# Patient Record
Sex: MALE | Race: OTHER
[De-identification: names, ages, dates, MRNs, and addresses within clinical notes are randomized per-mention and may not be internally consistent; named-entity substitution may affect disease eponyms.]

---

## 2018-02-13 ENCOUNTER — HOSPITAL ENCOUNTER (EMERGENCY)
Dept: HOSPITAL 76 - ED | Age: 33
Discharge: HOME | End: 2018-02-13
Payer: COMMERCIAL

## 2018-02-13 VITALS — DIASTOLIC BLOOD PRESSURE: 81 MMHG | SYSTOLIC BLOOD PRESSURE: 124 MMHG
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
## 2018-02-13 DIAGNOSIS — S01.511A: Primary | ICD-10-CM

## 2018-02-13 DIAGNOSIS — F17.200: ICD-10-CM
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
## 2018-02-13 PROCEDURE — 99283 EMERGENCY DEPT VISIT LOW MDM: CPT

## 2018-02-13 PROCEDURE — 99282 EMERGENCY DEPT VISIT SF MDM: CPT

## 2018-02-13 PROCEDURE — 12011 RPR F/E/E/N/L/M 2.5 CM/<: CPT

## 2018-02-13 NOTE — ED PHYSICIAN DOCUMENTATION
History of Present Illness





- Stated complaint


Stated Complaint: LIP LAC





- Chief complaint


Chief Complaint: Laceration





- Additonal information


Additional information: 





hx from pt


32 AD [employer] male


hit with a metal handle at work at [employer] and suffered a 2 cm lac to his upper L 

lip went to [facility] and sent to the ER for sutures


tdap UTD








Review of Systems


Skin: reports: Laceration (s)





PD PAST MEDICAL HISTORY





- Past Medical History


Past Medical History: Yes





- Past Surgical History


Past Surgical History: Yes


General: Appendectomy


HEENT: Tonsil/Adenoidectomy





- Present Medications


Home Medications: 


 Ambulatory Orders











 Medication  Instructions  Recorded  Confirmed


 


No Known Home Medications [No  02/13/18 02/13/18





Known Home Medications]   














- Allergies


Allergies/Adverse Reactions: 


 Allergies











Allergy/AdvReac Type Severity Reaction Status Date / Time


 


No Known Drug Allergies Allergy   Verified 02/13/18 09:14














- Social History


Does the pt smoke?: Yes


Smoking Status: Current some day smoker


Does the pt drink ETOH?: Yes


Does the pt have substance abuse?: No





- Immunizations


Immunizations are current?: Yes





PD ED PE NORMAL





- Vitals


Vital signs reviewed: Yes





- HEENT


HEENT: Other (2 cm lac almost aprallel to vermillion border but crossing, no 

inner lip lac and no tooth damage)





Results





- Vitals


Vitals: 


 Vital Signs - 24 hr











  02/13/18





  09:14


 


Temperature 36.5 C


 


Heart Rate 71


 


Respiratory 16





Rate 


 


Blood Pressure 124/81 H


 


O2 Saturation 99








 Oxygen











O2 Source                      Room air

















Procedures





- Laceration (location)


  ** lip


Length in cm: 2


Wound type: Linear


Neurovascular status: Sensory intact, Motor intact


Anesthesia: LET ((trying to avoid injection and local swelling as aligment at 

vermillion border important) - no perfusion abn/ischemia etc - good effect)


Wound Preparation: Irrigated copiously NS (by ER staff), Wound explored, To the 

base.  No: FB identified


Skin layer closure: Nylon, Interrupted, Size #-0 - enter number (6), Sutures - 

enter # (7)


Other: Patient tolerated well


Complexity: Simple (crossing vermillion border), Intermediate





Departure





- Departure


Disposition: 01 Home, Self Care


Clinical Impression: 


Facial laceration


Qualifiers:


 Encounter type: initial encounter Qualified Code(s): S01.81XA - Laceration 

without foreign body of other part of head, initial encounter





Condition: Good


Instructions:  ED Laceration Facial Sutr Tape, ED Scar Tips to Minimize


Follow-Up: 


[facility] [place] [Provider Group]


Comments: 


Keep the wound clean


Apply antibiotic ointment twice a day


Return for suture removal in 5 days


Although we washed the wound out carefully, there is always a risk for 

infection - if you notice any excessive redness swelling pain or discharge 

please come back to the ER right away


Discharge Date/Time: 02/13/18 12:20

## 2018-02-18 ENCOUNTER — HOSPITAL ENCOUNTER (EMERGENCY)
Dept: HOSPITAL 76 - ED | Age: 33
Discharge: HOME | End: 2018-02-18
Payer: COMMERCIAL

## 2018-02-18 VITALS — DIASTOLIC BLOOD PRESSURE: 71 MMHG | SYSTOLIC BLOOD PRESSURE: 117 MMHG

## 2018-02-18 DIAGNOSIS — F17.200: ICD-10-CM

## 2018-02-18 DIAGNOSIS — Z48.02: ICD-10-CM

## 2018-02-18 DIAGNOSIS — S01.511D: Primary | ICD-10-CM
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
## 2018-02-18 PROCEDURE — 99282 EMERGENCY DEPT VISIT SF MDM: CPT

## 2018-02-18 NOTE — ED PHYSICIAN DOCUMENTATION
PD HPI WOUND RECHECK





- Stated complaint


Stated Complaint: STITCH REMOVAL





- Chief complaint


Chief Complaint: General





- Histroy obtained from


History obtained from: Patient





- History of Present Illness


Location: Face


Timing - onset: How many days ago (5)


Associated symptoms: Other (no complaints)


Similar symptoms before: Has not had sx before


Recently seen: Emergency Dept





- Additional information


Additional information: 





32-year-old male had a crank break at work hitting him in the face 5 days ago.  

He had sutures placed to the left upper lip.  He is here now for suture 

removal.  He has had no complaints.





Review of Systems


Constitutional: denies: Fever


Eyes: denies: Decreased vision


Ears: denies: Ear pain


Nose: denies: Congestion


Throat: denies: Sore throat


Cardiac: denies: Chest pain / pressure


Respiratory: denies: Dyspnea


GI: denies: Abdominal Pain, Nausea, Vomiting


: denies: Dysuria, Frequency


Skin: reports: Laceration (s).  denies: Rash


Musculoskeletal: denies: Neck pain, Back pain


Neurologic: denies: Generalized weakness, Focal weakness, Numbness





PD PAST MEDICAL HISTORY





- Past Surgical History


Past Surgical History: Yes


General: Appendectomy


HEENT: Tonsil/Adenoidectomy





- Present Medications


Home Medications: 


 Ambulatory Orders











 Medication  Instructions  Recorded  Confirmed


 


No Known Home Medications [No  02/13/18 02/13/18





Known Home Medications]   














- Allergies


Allergies/Adverse Reactions: 


 Allergies











Allergy/AdvReac Type Severity Reaction Status Date / Time


 


No Known Drug Allergies Allergy   Verified 02/13/18 09:14














- Social History


Does the pt smoke?: Yes


Smoking Status: Current every day smoker


Does the pt drink ETOH?: Yes


Does the pt have substance abuse?: No





- Immunizations


Immunizations are current?: Yes





PD ED PE NORMAL





- Vitals


Vital signs reviewed: Yes (NORAL )





- General


General: Alert and oriented X 3, No acute distress, Well developed/nourished





- HEENT


HEENT: PERRL, EOMI, Other (There is a healing laceration to the left upper lip 

that appears the stated age and appears to be healing without difficulty.)





- Neck


Neck: Supple, no meningeal sign





- Respiratory


Respiratory: No respiratory distress





- Neuro


Neuro: Alert and oriented X 3


Eye Opening: Spontaneous


Motor: Obeys Commands


Verbal: Oriented


GCS Score: 15





- Psych


Psych: Normal mood, Normal affect





Results





- Vitals


Vitals: 





 Vital Signs - 24 hr











  02/18/18





  08:49


 


Temperature 36.5 C


 


Heart Rate 84


 


Respiratory 16





Rate 


 


Blood Pressure 117/71


 


O2 Saturation 98








 Oxygen











O2 Source                      Room air

















Procedures





- Suture/staple Removal (location)


  ** UPPER LIP


Suture/staple removal: # sutures (7), No complications, Other (Steri-strips 

would not stick to the area).  No: Dehiscence





PD MEDICAL DECISION MAKING





- ED course


Complexity details: considered differential, d/w patient


ED course: 





32-year-old male with a left upper lip laceration that appears to be healing 

well has his sutures removed I was not able to get Steri-Strips to stick to 

this gentleman's upper lip at all. 





Departure





- Departure


Disposition: 01 Home, Self Care


Clinical Impression: 


Facial laceration


Qualifiers:


 Encounter type: subsequent encounter Qualified Code(s): S01.81XD - Laceration 

without foreign body of other part of head, subsequent encounter





Instructions:  ED Wound Check Sutr Remove No Infec


Follow-Up: 


[facility] [place] [Provider Group]